# Patient Record
Sex: MALE | Race: WHITE | Employment: UNEMPLOYED | URBAN - METROPOLITAN AREA
[De-identification: names, ages, dates, MRNs, and addresses within clinical notes are randomized per-mention and may not be internally consistent; named-entity substitution may affect disease eponyms.]

---

## 2020-05-14 ENCOUNTER — OFFICE VISIT (OUTPATIENT)
Dept: URGENT CARE | Facility: URGENT CARE | Age: 25
End: 2020-05-14
Payer: COMMERCIAL

## 2020-05-14 VITALS
HEART RATE: 78 BPM | OXYGEN SATURATION: 98 % | SYSTOLIC BLOOD PRESSURE: 120 MMHG | RESPIRATION RATE: 20 BRPM | TEMPERATURE: 97.8 F | DIASTOLIC BLOOD PRESSURE: 74 MMHG

## 2020-05-14 DIAGNOSIS — Z20.2 EXPOSURE TO CHLAMYDIA: Primary | ICD-10-CM

## 2020-05-14 PROCEDURE — 87591 N.GONORRHOEAE DNA AMP PROB: CPT | Performed by: FAMILY MEDICINE

## 2020-05-14 PROCEDURE — 87491 CHLMYD TRACH DNA AMP PROBE: CPT | Performed by: FAMILY MEDICINE

## 2020-05-14 PROCEDURE — 99203 OFFICE O/P NEW LOW 30 MIN: CPT | Performed by: FAMILY MEDICINE

## 2020-05-14 RX ORDER — AZITHROMYCIN 500 MG/1
1000 TABLET, FILM COATED ORAL DAILY
Qty: 2 TABLET | Refills: 0 | Status: SHIPPED | OUTPATIENT
Start: 2020-05-14 | End: 2020-05-15

## 2020-05-14 NOTE — PROGRESS NOTES
SUBJECTIVE:   Timur Antony is a 25 year old male presenting with a chief complaint of STD exposure.    Patient was notified that partner tested positive for chlamydia yesterday.  Patient uses condoms sometimes, currently only 1 partner for the past 6 months.  Patient developed more burning sensation for the past 1 month.  Declined screen for blood tests for HIV, RPR and hepatitis.    History reviewed. No pertinent past medical history.  Current Outpatient Medications   Medication Sig Dispense Refill     azithromycin (ZITHROMAX) 500 MG tablet Take 2 tablets (1,000 mg) by mouth daily for 1 day 2 tablet 0     Social History     Tobacco Use     Smoking status: Former Smoker     Smokeless tobacco: Never Used   Substance Use Topics     Alcohol use: Not on file       ROS:  Review of systems negative except as stated above.    OBJECTIVE:  /74   Pulse 78   Temp 97.8  F (36.6  C) (Tympanic)   Resp 20   SpO2 98%   GENERAL APPEARANCE: healthy, alert and no distress  PSYCH: mentation appears normal and affect normal/bright    ASSESSMENT/PLAN:  (Z20.2) Exposure to chlamydia  (primary encounter diagnosis)  Plan: azithromycin (ZITHROMAX) 500 MG tablet,         Neisseria gonorrhoeae PCR, Chlamydia         trachomatis PCR            Empiric treatment for chlamydia, RX Azithromycin given.  Discussed concurrent treatment for gonorrhea with rocephin IM but declined.  Will follow up on STD screen and notify if any abnormalities.  Encourage no sex for 1 week, reviewed importance of safe sex with condom usage.  Consider STD test of cure in 2-4 weeks if desires.    Follow up with primary provider if no resolution of symptoms in 1 week    Tan Pettit MD, MD  May 14, 2020 9:44 AM

## 2020-05-15 ENCOUNTER — TELEPHONE (OUTPATIENT)
Dept: URGENT CARE | Facility: URGENT CARE | Age: 25
End: 2020-05-15

## 2020-05-15 LAB
C TRACH DNA SPEC QL NAA+PROBE: POSITIVE
N GONORRHOEA DNA SPEC QL NAA+PROBE: NEGATIVE
SPECIMEN SOURCE: ABNORMAL
SPECIMEN SOURCE: NORMAL